# Patient Record
Sex: MALE | Race: WHITE | Employment: UNEMPLOYED | RURAL
[De-identification: names, ages, dates, MRNs, and addresses within clinical notes are randomized per-mention and may not be internally consistent; named-entity substitution may affect disease eponyms.]

---

## 2017-05-23 ENCOUNTER — HOSPITAL ENCOUNTER (OUTPATIENT)
Dept: OTHER | Age: 27
Discharge: HOME | End: 2017-05-23
Attending: NURSE PRACTITIONER | Admitting: NURSE PRACTITIONER

## 2017-05-23 LAB
A/G RATIO: 2 (ref 0.8–2)
ALBUMIN SERPL-MCNC: 4.7 G/DL (ref 3.4–4.8)
ALP BLD-CCNC: 82 U/L (ref 25–100)
ALT SERPL-CCNC: 14 U/L (ref 4–36)
ANION GAP SERPL CALCULATED.3IONS-SCNC: 14 MMOL/L (ref 3–16)
AST SERPL-CCNC: 17 U/L (ref 8–33)
BASOPHILS ABSOLUTE: 0 K/UL (ref 0–0.1)
BASOPHILS RELATIVE PERCENT: 0.2 %
BILIRUB SERPL-MCNC: 0.5 MG/DL (ref 0.3–1.2)
BUN BLDV-MCNC: 12 MG/DL (ref 6–20)
CALCIUM SERPL-MCNC: 9.5 MG/DL (ref 8.5–10.5)
CHLORIDE BLD-SCNC: 101 MMOL/L (ref 98–107)
CO2: 24 MMOL/L (ref 20–30)
CREAT SERPL-MCNC: 0.9 MG/DL (ref 0.4–1.2)
EOSINOPHILS ABSOLUTE: 0 K/UL (ref 0–0.4)
EOSINOPHILS RELATIVE PERCENT: 0.3 %
GFR AFRICAN AMERICAN: >59
GFR NON-AFRICAN AMERICAN: >59
GLOBULIN: 2.3 G/DL
GLUCOSE BLD-MCNC: 97 MG/DL (ref 74–106)
HCT VFR BLD CALC: 48.1 % (ref 40–54)
HEMOGLOBIN: 16.9 G/DL (ref 13–18)
LYMPHOCYTES ABSOLUTE: 2.5 K/UL (ref 1.5–4)
LYMPHOCYTES RELATIVE PERCENT: 23.4 % (ref 20–40)
MCH RBC QN AUTO: 32.3 PG (ref 27–32)
MCHC RBC AUTO-ENTMCNC: 35.1 G/DL (ref 31–35)
MCV RBC AUTO: 91.8 FL (ref 80–100)
MONOCYTES ABSOLUTE: 0.7 K/UL (ref 0.2–0.8)
MONOCYTES RELATIVE PERCENT: 6.2 % (ref 3–10)
NEUTROPHILS ABSOLUTE: 7.5 K/UL (ref 2–7.5)
NEUTROPHILS RELATIVE PERCENT: 69.9 %
PDW BLD-RTO: 13.1 % (ref 11–16)
PLATELET # BLD: 209 K/UL (ref 150–400)
PMV BLD AUTO: 10.4 FL (ref 6–10)
POTASSIUM SERPL-SCNC: 4.2 MMOL/L (ref 3.4–5.1)
RBC # BLD: 5.24 M/UL (ref 4.5–6)
SODIUM BLD-SCNC: 139 MMOL/L (ref 136–145)
TOTAL PROTEIN: 7 G/DL (ref 6.4–8.3)
WBC # BLD: 10.8 K/UL (ref 4–11)

## 2017-05-24 LAB
HAV IGM SER IA-ACNC: NORMAL
HEPATITIS B CORE IGM ANTIBODY: NORMAL
HEPATITIS B SURFACE ANTIGEN INTERPRETATION: NORMAL
HEPATITIS C ANTIBODY INTERPRETATION: NORMAL

## 2019-06-06 ENCOUNTER — HOSPITAL ENCOUNTER (EMERGENCY)
Facility: HOSPITAL | Age: 29
Discharge: HOME | End: 2019-06-06
Attending: HOSPITALIST
Payer: MEDICAID

## 2019-06-06 ENCOUNTER — APPOINTMENT (OUTPATIENT)
Dept: GENERAL RADIOLOGY | Facility: HOSPITAL | Age: 29
End: 2019-06-06
Payer: MEDICAID

## 2019-06-06 VITALS
DIASTOLIC BLOOD PRESSURE: 79 MMHG | RESPIRATION RATE: 14 BRPM | HEART RATE: 89 BPM | BODY MASS INDEX: 20.41 KG/M2 | OXYGEN SATURATION: 98 % | SYSTOLIC BLOOD PRESSURE: 131 MMHG | TEMPERATURE: 98.4 F | HEIGHT: 73 IN | WEIGHT: 154 LBS

## 2019-06-06 DIAGNOSIS — S62.300A UNSPECIFIED FRACTURE OF SECOND METACARPAL BONE, RIGHT HAND, INITIAL ENCOUNTER FOR CLOSED FRACTURE: Primary | ICD-10-CM

## 2019-06-06 PROCEDURE — 6370000000 HC RX 637 (ALT 250 FOR IP): Performed by: HOSPITALIST

## 2019-06-06 PROCEDURE — 99283 EMERGENCY DEPT VISIT LOW MDM: CPT

## 2019-06-06 PROCEDURE — 73130 X-RAY EXAM OF HAND: CPT

## 2019-06-06 PROCEDURE — 6370000000 HC RX 637 (ALT 250 FOR IP)

## 2019-06-06 RX ORDER — OXYCODONE HYDROCHLORIDE AND ACETAMINOPHEN 5; 325 MG/1; MG/1
2 TABLET ORAL ONCE
Status: COMPLETED | OUTPATIENT
Start: 2019-06-06 | End: 2019-06-06

## 2019-06-06 RX ORDER — OXYCODONE AND ACETAMINOPHEN 10; 325 MG/1; MG/1
1 TABLET ORAL EVERY 4 HOURS PRN
Qty: 18 TABLET | Refills: 0 | Status: SHIPPED | OUTPATIENT
Start: 2019-06-06 | End: 2019-06-09

## 2019-06-06 RX ORDER — ONDANSETRON 4 MG/1
4 TABLET, ORALLY DISINTEGRATING ORAL EVERY 4 HOURS PRN
Qty: 15 TABLET | Refills: 0 | Status: SHIPPED | OUTPATIENT
Start: 2019-06-06 | End: 2021-08-26

## 2019-06-06 RX ORDER — ONDANSETRON 4 MG/1
TABLET, ORALLY DISINTEGRATING ORAL
Status: COMPLETED
Start: 2019-06-06 | End: 2019-06-06

## 2019-06-06 RX ORDER — ONDANSETRON 4 MG/1
4 TABLET, ORALLY DISINTEGRATING ORAL ONCE
Status: COMPLETED | OUTPATIENT
Start: 2019-06-06 | End: 2019-06-06

## 2019-06-06 RX ADMIN — ONDANSETRON 4 MG: 4 TABLET, ORALLY DISINTEGRATING ORAL at 17:52

## 2019-06-06 RX ADMIN — OXYCODONE HYDROCHLORIDE AND ACETAMINOPHEN 2 TABLET: 5; 325 TABLET ORAL at 17:16

## 2019-06-06 ASSESSMENT — PAIN - FUNCTIONAL ASSESSMENT: PAIN_FUNCTIONAL_ASSESSMENT: 0-10

## 2019-06-06 ASSESSMENT — PAIN SCALES - GENERAL
PAINLEVEL_OUTOF10: 6
PAINLEVEL_OUTOF10: 9
PAINLEVEL_OUTOF10: 8

## 2019-06-06 ASSESSMENT — PAIN DESCRIPTION - PAIN TYPE
TYPE: ACUTE PAIN
TYPE: ACUTE PAIN

## 2019-06-06 ASSESSMENT — PAIN DESCRIPTION - ORIENTATION
ORIENTATION: RIGHT
ORIENTATION: RIGHT

## 2019-06-06 ASSESSMENT — PAIN DESCRIPTION - LOCATION
LOCATION: HAND
LOCATION: HAND

## 2019-06-06 ASSESSMENT — PAIN DESCRIPTION - DESCRIPTORS
DESCRIPTORS: ACHING
DESCRIPTORS: THROBBING

## 2019-06-06 ASSESSMENT — PAIN DESCRIPTION - FREQUENCY: FREQUENCY: CONTINUOUS

## 2019-06-06 NOTE — ED PROVIDER NOTES
[facility] ENCOUNTER      Pt Name: Catherine Bowen  MRN: [id number]  YOB: 1990  Date of evaluation: 6/6/2019  Provider: George Santacruz [address] [place] [address]       Chief Complaint   Patient presents with    Hand Injury     right         HISTORY OF PRESENT ILLNESS  (Location/Symptom, Timing/Onset, Context/Setting, Quality, Duration, Modifying Factors, Severity.)   Catherine Bowen is a 26 y.o. male who presents to the emergency department for right hand injury. Patient states he was helping someone cut some wood he reached down to get a block of it when another block, also saw and struck him on the top of the hand. Patient denies any entrapment of this and states the blockages hit the hand and bounced off. He does complain of swelling and pain to the base of the 1st finger. Patient has not really taken anything for pain. He states the injury happened about 6-8 hours ago. Denies any numbness tingling weakness of extremities out of ordinary. Patient states she just has difficulty moving the 1st finger and the thumb secondary to swelling in the hand. Patient has not used any ice over the injury prior to arrival. Denies any other injury or complaint. Nursing notes were reviewed. REVIEW OFSYSTEMS    (2-9 systems for level 4, 10 or more for level 5)   ROS:  General:  No fevers, no chills, no weakness  Cardiovascular:  No chest pain, no palpitations  Respiratory:  No shortness of breath, no cough, no wheezing  Gastrointestinal:  No pain, no nausea, no vomiting, no diarrhea  Musculoskeletal:  No muscle pain, +right hand pain  Skin:  No rash, no easy bruising  Neurologic:  No speech problems, no headache, no extremity weakness  Psychiatric:  No anxiety  Genitourinary:  No dysuria, no hematuria    Except as noted above the remainder of the review of systems was reviewed and negative. PAST MEDICAL HISTORY   History reviewed.  No pertinent past medical history.       SURGICAL HISTORY       Past Surgical History:   Procedure Laterality Date    DENTAL SURGERY      all teeth removed     MOUTH SURGERY           CURRENT MEDICATIONS       Previous Medications    No medications on file       ALLERGIES     Amoxicillin and Pcn [penicillins]    FAMILY HISTORY       Family History   Problem Relation Age of Onset    Diabetes Mother     Asthma Mother     Other Mother     Other Maternal Grandfather           SOCIAL HISTORY       Social History     Socioeconomic History    Marital status: Single     Spouse name: None    Number of children: None    Years of education: None    Highest education level: None   Occupational History    None   Social Needs    Financial resource strain: None    Food insecurity:     Worry: None     Inability: None    Transportation needs:     Medical: None     Non-medical: None   Tobacco Use    Smoking status: Current Every Day Smoker     Packs/day: 1.00     Years: 14.00     Pack years: 14.00     Types: Cigarettes    Smokeless tobacco: Never Used   Substance and Sexual Activity    Alcohol use: Yes     Comment: occasionaly    Drug use: No     Frequency: 2.0 times per week     Types: Marijuana     Comment: patient states he just quick    Sexual activity: None   Lifestyle    Physical activity:     Days per week: None     Minutes per session: None    Stress: None   Relationships    Social connections:     Talks on phone: None     Gets together: None     Attends Voodoo service: None     Active member of club or organization: None     Attends meetings of clubs or organizations: None     Relationship status: None    Intimate partner violence:     Fear of current or ex partner: None     Emotionally abused: None     Physically abused: None     Forced sexual activity: None   Other Topics Concern    None   Social History Narrative    None         PHYSICAL EXAM    (up to 7 for level 4, 8 or more for level 5)     ED Triage Vitals [06/06/19 1705]   BP Temp Temp Source Pulse Resp SpO2 Height Weight   124/84 98.4 °F (36.9 °C) Oral 100 18 99 % 6' 1\" (1.854 m) 154 lb (69.9 kg)       Physical Exam  General :Patient is awake, alert, oriented, in no acute distress, nontoxic appearing  HEENT: Pupils are equally round and reactive to light, EOMI, conjunctivae clear. Oral mucosa is moist, no exudate. Uvula is midline  Cardiac: Heart regular rate, rhythm, no murmurs, rubs, or gallops  Lungs: Lungs are clear to auscultation, there is no wheezing, rhonchi, or rales. There is no use of accessory muscles. Abdomen: Abdomen is soft, nontender, nondistended. There is no firm or pulsatile masses, no rebound rigidity or guarding. Musculoskeletal: 5 out of 5 strength in all 4 extremities. No focal muscle deficits are appreciated, obvious swelling noted at the base of the 2nd digit of the right hand. Range of motion is intact however he does have difficulty with bringing the thumb and pinky finger together secondary to swelling. There is mild bruising noted at the base of the 2nd digit also. Sensation is still intact. Cap refill less than 2 seconds  Neuro: Motor intact, sensory intact, level of consciousness is normal  Dermatology: Skin is warm and dry  Psych: Mentation is grossly normal, cognition is grossly normal. Affect is appropriate.       DIAGNOSTIC RESULTS     EKG: All EKG's are interpreted by the Emergency Department Physician who either signs or Co-signs this chart in the [address] a cardiologist.        RADIOLOGY:   Non-plain film images such as CT, Ultrasound and MRI are read by the radiologist. Plain radiographic images are visualized and preliminarily interpreted by the emergency physician with the below findings:      ? Radiologist's Report Reviewed:  XR HAND RIGHT (MIN 3 VIEWS)   Final Result      Acute fracture involving second metatarsal head and metaphysis            ED BEDSIDE ULTRASOUND:   Performed by ED Physician - none    LABS:    I have reviewed and interpreted all of the currently available lab results from this visit (ifapplicable):  No results found for this visit on 06/06/19. All other labs were within normal range or not returned as of this dictation. EMERGENCY DEPARTMENT COURSE and DIFFERENTIAL DIAGNOSIS/MDM:   Vitals:    Vitals:    06/06/19 1705   BP: 124/84   Pulse: 100   Resp: 18   Temp: 98.4 °F (36.9 °C)   TempSrc: Oral   SpO2: 99%   Weight: 154 lb (69.9 kg)   Height: 6' 1\" (1.854 m)       MEDICATIONS ADMINISTERED IN ED:  Medications   oxyCODONE-acetaminophen (PERCOCET) 5-325 MG per tablet 2 tablet (2 tablets Oral Given 6/6/19 1716)   ondansetron (ZOFRAN-ODT) disintegrating tablet 4 mg (4 mg Oral Given 6/6/19 1752)       After initial evaluation and examination I did have conversation with the patient about the upcoming plan, treatment and possible disposition which they were agreeable to the time of this dictation. Patient otherwise we will call a radiograph of the right hand. We will also supplying him with some ice here for the swelling. Patient be given 5/325 2 tablets Percocet for pain. Patient advised couldn't arrive anything stronger than Motrin if the radiograph is negative for fracture he does state his understanding. Patient's final disposition be determined once his radiological studies been performed and reviewed. Radiograph the right hand read by radiology as acute fracture involving 2nd minute carpal head and metastasis    Patient's radiological studies were discussed with him and he does state his understanding. She was offered imaging here with a Ortho-Glass splint however we do not have the Ortho-Glass wide enough to cover all the way up to the index finger on the hand the patient has been placed in a wrist splint which will not provide the most adequate at best immobilization for that hand but is better than what we could acquire with the use of the Ortho-Glass.  Patient was placed in a Velcro wrist splint which is the phrases that may be inappropriate. If there are any questions or concerns please feel free to contact the dictating providerfor clarification.     Donte Tam DO  Attending Emergency Physician              Donte Tam DO  06/06/19 [id number]

## 2019-06-07 ENCOUNTER — OFFICE VISIT (OUTPATIENT)
Dept: ORTHOPEDIC SURGERY | Facility: CLINIC | Age: 29
End: 2019-06-07

## 2019-06-07 VITALS — HEIGHT: 71 IN | WEIGHT: 173.6 LBS | BODY MASS INDEX: 24.3 KG/M2 | RESPIRATION RATE: 18 BRPM

## 2019-06-07 DIAGNOSIS — M79.641 RIGHT HAND PAIN: Primary | ICD-10-CM

## 2019-06-07 DIAGNOSIS — S62.360A CLOSED NONDISPLACED FRACTURE OF NECK OF SECOND METACARPAL BONE OF RIGHT HAND, INITIAL ENCOUNTER: ICD-10-CM

## 2019-06-07 PROCEDURE — 29125 APPL SHORT ARM SPLINT STATIC: CPT | Performed by: PHYSICIAN ASSISTANT

## 2019-06-07 PROCEDURE — 99203 OFFICE O/P NEW LOW 30 MIN: CPT | Performed by: PHYSICIAN ASSISTANT

## 2019-06-07 RX ORDER — INDOMETHACIN 25 MG/1
25 CAPSULE ORAL 3 TIMES DAILY PRN
Qty: 15 CAPSULE | Refills: 0 | Status: SHIPPED | OUTPATIENT
Start: 2019-06-07 | End: 2019-06-11

## 2019-06-07 RX ORDER — OXYCODONE AND ACETAMINOPHEN 10; 325 MG/1; MG/1
TABLET ORAL
Refills: 0 | COMMUNITY
Start: 2019-06-06 | End: 2022-10-04

## 2019-06-07 RX ORDER — ONDANSETRON 4 MG/1
TABLET, ORALLY DISINTEGRATING ORAL
Refills: 0 | COMMUNITY
Start: 2019-06-06

## 2019-06-07 RX ORDER — HYDROCODONE BITARTRATE AND ACETAMINOPHEN 5; 325 MG/1; MG/1
1 TABLET ORAL EVERY 12 HOURS PRN
Qty: 14 TABLET | Refills: 0 | OUTPATIENT
Start: 2019-06-07 | End: 2022-10-04

## 2019-06-07 NOTE — PROGRESS NOTES
Subjective   Patient ID: Jorge Lopez is a 29 y.o. right hand dominant male  Pain of the Right Hand (Patient is here today for a right hand fracture, he states he was helping packing logs for a pig roast and his hand got caught in between to two big tree blocks. He states this happened 2 days ago.)         History of Present Illness  Patient presents as a new patient with complaints of right hand injury.  He states his right hand got caught between 2 tree blocks 2 days ago injuring the right hand.  He was seen at [facility] had x-rays diagnosed with a second metacarpal fracture and placed in an Ace wrap.  Patient states he did crush his third finger years ago but has done well since that injury  Patient denies numbness or tingling                                                 History reviewed. No pertinent past medical history.     History reviewed. No pertinent surgical history.    History reviewed. No pertinent family history.    Social History     Socioeconomic History   • Marital status: Unknown     Spouse name: Not on file   • Number of children: Not on file   • Years of education: Not on file   • Highest education level: Not on file   Tobacco Use   • Smoking status: Current Every Day Smoker   • Smokeless tobacco: Never Used   Substance and Sexual Activity   • Alcohol use: Yes   • Drug use: Yes     Types: Marijuana   • Sexual activity: Defer       I have reviewed all of the above social hx, family hx, surgical hx, medications, allergies & ROS and confirm that it is accurate.      Current Outpatient Medications:   •  HYDROcodone-acetaminophen (NORCO) 5-325 MG per tablet, Take 1 tablet by mouth Every 12 (Twelve) Hours As Needed for Other (PRN PAIN)., Disp: 14 tablet, Rfl: 0  •  indomethacin (INDOCIN) 25 MG capsule, Take 1 capsule by mouth 3 (Three) Times a Day As Needed for Mild Pain ., Disp: 15 capsule, Rfl: 0  •  ondansetron ODT (ZOFRAN-ODT) 4 MG disintegrating tablet, dissolve 1 tablet ON TONGUE  "every 4 hours if needed for nausea and vomiting, Disp: , Rfl: 0  •  oxyCODONE-acetaminophen (PERCOCET)  MG per tablet, take 1 tablet by mouth every 4 hours if needed for pain, Disp: , Rfl: 0    Allergies   Allergen Reactions   • Amoxicillin Rash       Review of Systems   Constitutional: Negative for fever.   HENT: Negative for voice change.    Eyes: Negative for visual disturbance.   Respiratory: Negative for shortness of breath.    Cardiovascular: Negative for chest pain.   Gastrointestinal: Negative for abdominal pain.   Genitourinary: Negative for dysuria.   Musculoskeletal: Positive for arthralgias. Negative for gait problem and joint swelling.   Skin: Negative for rash.   Neurological: Negative for speech difficulty.   Hematological: Does not bruise/bleed easily.   Psychiatric/Behavioral: Negative for confusion.       Objective   Resp 18   Ht 180.3 cm (71\")   Wt 78.7 kg (173 lb 9.6 oz)   BMI 24.21 kg/m²    Physical Exam   Constitutional: He appears well-developed.   Pulmonary/Chest: Effort normal.   Musculoskeletal:        Right wrist: He exhibits swelling. He exhibits no bony tenderness, no deformity and no laceration.        Right hand: He exhibits tenderness (dorsum of hand 2nd MC) and swelling. He exhibits normal two-point discrimination and normal capillary refill. Normal sensation noted. Normal strength noted.   Neurological: He is alert.   Psychiatric: He has a normal mood and affect.   Vitals reviewed.    Ortho Exam      Neurologic Exam     Neg. Zack's test          Assessment/Plan   Independent Review of Radiographic Studies:    I did review x-ray imaging from [facility] of the right hand.  There is an acute second metacarpal head and neck fracture.    I did reorder new x-rays of the right hand in the office since he was only immobilized in an Ace wrap to make certain there has been no interval displacement or change-there is been no interval change when compared to prior " imaging      Procedures       Jorge was seen today for pain.    Diagnoses and all orders for this visit:    Right hand pain  -     XR Wrist 3+ View Right    Closed nondisplaced fracture of neck of second metacarpal bone of right hand, initial encounter    Other orders  -     indomethacin (INDOCIN) 25 MG capsule; Take 1 capsule by mouth 3 (Three) Times a Day As Needed for Mild Pain .       Discussion of orthopedic goals  Risk, benefits, and merits of treatment alternatives reviewed with the patient and questions answered  Reduced physical activity as appropriate  Weight bearing parameters reviewed  Avoid offending activity    Recommendations/Plan:  Patient is encouraged to call or return for any issues or concerns.  Due to the swelling, I would like to hold off on fiberglass cast.  However, we did place the patient in a short arm Ortho-Glass splint immobilizing the second and third digits extending the orthoglass to the wrist.   Padding was placed interdigitally to the second third digit to prevent skin irritation    FU in 5 days for cast placement and re-examination    Patient agreeable to call or return sooner for any concerns.

## 2019-06-10 DIAGNOSIS — S62.360A CLOSED NONDISPLACED FRACTURE OF NECK OF SECOND METACARPAL BONE OF RIGHT HAND, INITIAL ENCOUNTER: Primary | ICD-10-CM

## 2019-06-11 ENCOUNTER — OFFICE VISIT (OUTPATIENT)
Dept: ORTHOPEDIC SURGERY | Facility: CLINIC | Age: 29
End: 2019-06-11

## 2019-06-11 VITALS — HEIGHT: 71 IN | BODY MASS INDEX: 24.22 KG/M2 | WEIGHT: 173 LBS | RESPIRATION RATE: 18 BRPM

## 2019-06-11 DIAGNOSIS — M79.641 RIGHT HAND PAIN: ICD-10-CM

## 2019-06-11 DIAGNOSIS — S62.360A CLOSED NONDISPLACED FRACTURE OF NECK OF SECOND METACARPAL BONE OF RIGHT HAND, INITIAL ENCOUNTER: Primary | ICD-10-CM

## 2019-06-11 PROCEDURE — 99212 OFFICE O/P EST SF 10 MIN: CPT | Performed by: PHYSICIAN ASSISTANT

## 2019-06-11 PROCEDURE — 29075 APPL CST ELBW FNGR SHORT ARM: CPT | Performed by: PHYSICIAN ASSISTANT

## 2019-06-11 RX ORDER — HYDROCODONE BITARTRATE AND ACETAMINOPHEN 5; 325 MG/1; MG/1
1 TABLET ORAL EVERY 12 HOURS PRN
Qty: 8 TABLET | Refills: 0 | OUTPATIENT
Start: 2019-06-11 | End: 2022-10-04

## 2019-06-11 RX ORDER — MELOXICAM 7.5 MG/1
7.5 TABLET ORAL DAILY
Qty: 14 TABLET | Refills: 0
Start: 2019-06-11

## 2019-06-11 NOTE — PROGRESS NOTES
Subjective   Patient ID: Jorge Lopez is a 29 y.o. right hand dominant male   Follow-up of the Right Hand (2nd metatarsal fracture, also reports a fall 6/10/19 down a wet hill)         CHIEF COMPLAINT:        History of Present Illness    Pain controlled: [x] no patient request a refill on the narcotic pain medication     Medication refill requested: [] no   [x] yes     Patient states the swelling has eased some. Denies numbness or tingling to Right hand.  He states yesterday he did trip and fall but was wearing the splint and tried to protect the right hand as much as possible   Other:        History reviewed. No pertinent past medical history.     No past surgical history on file.     History reviewed. No pertinent family history.    Social History     Socioeconomic History   • Marital status: Unknown     Spouse name: Not on file   • Number of children: Not on file   • Years of education: Not on file   • Highest education level: Not on file   Tobacco Use   • Smoking status: Current Every Day Smoker   • Smokeless tobacco: Never Used   Substance and Sexual Activity   • Alcohol use: Yes   • Drug use: Yes     Types: Marijuana   • Sexual activity: Defer       Allergies   Allergen Reactions   • Indometacin Sodium [Indomethacin] Hallucinations   • Penicillins Swelling     Rash swelling     • Amoxicillin Rash       Review of Systems   Constitutional: Negative for diaphoresis, fever and unexpected weight change.   HENT: Negative for dental problem and sore throat.    Eyes: Negative for visual disturbance.   Respiratory: Negative for shortness of breath.    Cardiovascular: Negative for chest pain.   Gastrointestinal: Negative for abdominal pain, constipation, diarrhea, nausea and vomiting.   Genitourinary: Negative for difficulty urinating and frequency.   Musculoskeletal: Positive for arthralgias (right hand).   Neurological: Negative for headaches.   Hematological: Does not bruise/bleed easily.       [x]  The past medical  "history, past surgical history, family history, social history, allergies and review of systems have been reviewed by myself.    Objective   Resp 18   Ht 180.3 cm (71\")   Wt 78.5 kg (173 lb)   BMI 24.13 kg/m²      Signs of infection: [x] no                  [] yes   Swelling: [x] no                  [] yes   Skin wound: [] healing well   [] healed well   [x] skin intact   Motor exam intact: [] no                  [x] yes   Neurovascular exam intact: [] no                  [x] yes   Signs of compartment syndrome: [x] no                  [] yes   Signs of DVT: [x] no                  [] yes   Other:      Physical Exam   Constitutional: He appears well-developed.   Pulmonary/Chest: Effort normal.   Musculoskeletal:        Right wrist: He exhibits normal range of motion, no bony tenderness, no swelling, no crepitus, no deformity and no laceration.        Right hand: He exhibits tenderness (dorsum of right hand) and swelling (minimal and improved when compared to last office visit). He exhibits no deformity. Normal sensation noted. Normal strength noted. He exhibits no thumb/finger opposition.   Neurological: He is alert.   Psychiatric: He has a normal mood and affect.   Vitals reviewed.    Ortho Exam    There is no scissoring deformity with flexion of the right hand digits    Neurologic Exam      Assessment/Plan   Independent Review of Radiographic Studies:    X-ray of the right hand performed in the office for evaluation of second metacarpal head neck fracture healing.  Comparison films are available reviewed.  There has been no interval displacement.  There is satisfactory position alignment.  Dr. Aquino reviewed the imaging as well and concurs with the plan of care       Procedures    Jorge was seen today for follow-up.    Diagnoses and all orders for this visit:    Closed nondisplaced fracture of neck of second metacarpal bone of right hand, initial encounter    Right hand pain    Other orders  -     meloxicam " "(MOBIC) 7.5 MG tablet; Take 1 tablet by mouth Daily.         Physical therapy: Not ordered   Ultrasound: [x]not ordered         []order given to patient   Labs: [x]not ordered         []order given to patient   Weight Bearing status: []Full []WBAT []PWB [x]NWB []Other      Reduced physical activity as appropriate and avoid offending activity  Weight bearing parameters reviewed  Discussion of orthopaedic goals and activities and patient and/or guardian expressed appreciation.  Risk, benefits, and merits of treatment alternatives reviewed with the patient and/or guardian and questions answered  Regular exercise as tolerated  Guided on proper techniques for mobility, strength, agility and/or conditioning exercises  Cast, splint and/or brace care and usage instructions given         Recommendations/Plan:    Patient states the indomethacin did not cause any symptoms until after he had had several doses he noticed \"objects running across the floor\" his mother advised he stop taking the medicine.  He does request a refill on the narcotic pain prescription.     FU in 3 weeks XOA  Patient was placed in a fiberglass cast with padding interdigitally to the second /third digits.  The cast secured the 2nd and 3rd digits    norco rx was also sent to Dr. Aquino for approval. See med tab    Patient is encouraged and agreeable to call or return sooner for any issues or concerns.         "

## 2019-06-25 ENCOUNTER — HOSPITAL ENCOUNTER (EMERGENCY)
Facility: HOSPITAL | Age: 29
Discharge: HOME | End: 2019-06-25
Attending: EMERGENCY MEDICINE
Payer: MEDICAID

## 2019-06-25 VITALS
BODY MASS INDEX: 23.7 KG/M2 | OXYGEN SATURATION: 94 % | DIASTOLIC BLOOD PRESSURE: 89 MMHG | HEIGHT: 72 IN | TEMPERATURE: 98.5 F | SYSTOLIC BLOOD PRESSURE: 124 MMHG | RESPIRATION RATE: 16 BRPM | WEIGHT: 175 LBS | HEART RATE: 111 BPM

## 2019-06-25 DIAGNOSIS — Z48.00 ENCOUNTER FOR CAST CARE: Primary | ICD-10-CM

## 2019-06-25 PROCEDURE — 99282 EMERGENCY DEPT VISIT SF MDM: CPT

## 2019-06-25 ASSESSMENT — PAIN SCALES - GENERAL
PAINLEVEL_OUTOF10: 4
PAINLEVEL_OUTOF10: 7

## 2019-06-25 ASSESSMENT — PAIN DESCRIPTION - LOCATION
LOCATION: ARM
LOCATION: ARM

## 2019-06-25 ASSESSMENT — PAIN DESCRIPTION - PAIN TYPE
TYPE: ACUTE PAIN
TYPE: ACUTE PAIN

## 2019-06-25 ASSESSMENT — PAIN DESCRIPTION - ORIENTATION
ORIENTATION: RIGHT
ORIENTATION: RIGHT

## 2019-06-25 ASSESSMENT — PAIN DESCRIPTION - DESCRIPTORS
DESCRIPTORS: SHARP;BURNING
DESCRIPTORS: ACHING

## 2019-06-25 ASSESSMENT — PAIN DESCRIPTION - FREQUENCY: FREQUENCY: INTERMITTENT

## 2019-06-25 ASSESSMENT — PAIN DESCRIPTION - PROGRESSION: CLINICAL_PROGRESSION: GRADUALLY WORSENING

## 2019-06-25 ASSESSMENT — PAIN - FUNCTIONAL ASSESSMENT: PAIN_FUNCTIONAL_ASSESSMENT: 0-10

## 2019-06-25 ASSESSMENT — PAIN DESCRIPTION - ONSET: ONSET: ON-GOING

## 2019-06-26 NOTE — ED TRIAGE NOTES
Pt. States had a right arm fracture linn. 2 weeks ago when a block of wood fell on arm states wore cast linn. 1 week when cast became wet and pt.  Removed him self states increased pain from fingers to elbow

## 2019-06-26 NOTE — ED PROVIDER NOTES
[address] [place] [address] Court  eMERGENCY dEPARTMENT eNCOUnter      Pt Name: Jose Simpson  MRN: [id number]  YOB: 1990  Date ofevaluation: 6/59/1407  Provider: Fidel Man MD    [address] [place] [address]       Chief Complaint   Patient presents with    Arm Pain     right arm pain pt. states got his cast wet- from an FX and removed Sunday had on linn. 1 week pain from fingers to elbow         HISTORY OF PRESENT ILLNESS  (Location/Symptom, Timing/Onset, Context/Setting, Quality, Duration, Modifying Factors, Severity.)   Jose Simposn is a 26 y.o. male who presents to the emergency department for right arm pain. He has a known right 2nd metacarpal fracture. He had a cast on his arm that was placed by ortho but he admits that he got it wet and cut it off. He says the cast was \"getting on my nerves\". Nursing notes were reviewed. REVIEW OF SYSTEMS    (2-9systems for level 4, 10 or more for level 5)   ROS:  General:  No fevers, no chills, no weakness  HEENT: No sore throat, runny nose or ear pain  Cardiovascular:  No chest pain, no palpitations  Respiratory:  No shortness of breath, no cough, no wheezing  Gastrointestinal:  No pain, no nausea, no vomiting, no diarrhea  Musculoskeletal:  + right hand/forearm pain from previous fracture. No muscle pain, no joint pain  Skin:  No rash, no easy bruising  Genitourinary:  No dysuria, no hematuria    Except as noted above theremainder of the review of systems was reviewed and negative.        PASTMEDICAL HISTORY     Past Medical History:   Diagnosis Date    Arm fracture, right          SURGICAL HISTORY       Past Surgical History:   Procedure Laterality Date    DENTAL SURGERY      all teeth removed     MOUTH SURGERY           CURRENT MEDICATIONS       Previous Medications    ONDANSETRON (ZOFRAN ODT) 4 MG DISINTEGRATING TABLET    Take 1 tablet by mouth every 4 hours as needed for Nausea or Vomiting       ALLERGIES     Amoxicillin and Pcn [penicillins]    FAMILY HISTORY       Family History   Problem Relation Age of Onset    Diabetes Mother     Asthma Mother     Other Mother     Other Maternal Grandfather           SOCIAL HISTORY       Social History     Socioeconomic History    Marital status: Single     Spouse name: None    Number of children: None    Years of education: None    Highest education level: None   Occupational History    None   Social Needs    Financial resource strain: None    Food insecurity:     Worry: None     Inability: None    Transportation needs:     Medical: None     Non-medical: None   Tobacco Use    Smoking status: Current Every Day Smoker     Packs/day: 1.00     Years: 14.00     Pack years: 14.00     Types: Cigarettes    Smokeless tobacco: Never Used   Substance and Sexual Activity    Alcohol use: Yes     Comment: occasionaly    Drug use: Yes     Types: Marijuana     Comment: states used last night    Sexual activity: None   Lifestyle    Physical activity:     Days per week: None     Minutes per session: None    Stress: None   Relationships    Social connections:     Talks on phone: None     Gets together: None     Attends Voodoo service: None     Active member of club or organization: None     Attends meetings of clubs or organizations: None     Relationship status: None    Intimate partner violence:     Fear of current or ex partner: None     Emotionally abused: None     Physically abused: None     Forced sexual activity: None   Other Topics Concern    None   Social History Narrative    None         PHYSICAL EXAM    (up to 7 forlevel 4, 8 or more for level 5)     ED Triage Vitals   BP Temp Temp Source Pulse Resp SpO2 Height Weight   06/25/19 2124 06/25/19 2133 06/25/19 2124 06/25/19 2124 06/25/19 2124 06/25/19 2124 06/25/19 2124 06/25/19 2124   (!) 142/90 98.5 °F (36.9 °C) Oral 106 20 99 % 6' (1.829 m) 175 lb (79.4 kg)       Physical Exam  General :Patient is awake, alert, oriented, in no acute distress, nontoxic appearing  HEENT: Pupils are equally round and reactive to light, EOMI. Cardiac: Heart regular rate, rhythm, no murmurs, rubs, or gallops  Lungs: Lungs are clear to auscultation, there is no wheezing, rhonchi, or rales. Abdomen:Abdomen is soft, nontender, nondistended. Musculoskeletal: Ambulatory; tenderness to palpation to right hand/wrist; no swelling or ecchymosis. Back: No midline or bony tenderness. Dermatology: Skin is warm and dry  Psych: Mentation is grossly normal, cognition is grossly normal. Affect is appropriate. DIAGNOSTIC RESULTS       RADIOLOGY:   Non-plain film images such as CT, Ultrasoundand MRI are read by the radiologist. Plain radiographic images are visualized and preliminarily interpreted by the emergency physician with the below findings:      [] Radiologist's Report Reviewed:  No orders to display         ED BEDSIDE ULTRASOUND:   Performed by ED Physician - none    LABS:  Labs Reviewed - No data to display    I have reviewed and interpreted all of the currently available lab resultsfrom this visit (if applicable):  No results found for this visit on 06/25/19. All other labs were within normal range or not returned as of this dictation. EMERGENCY DEPARTMENT COURSE and DIFFERENTIAL DIAGNOSIS/MDM:   Vitals:    Vitals:    06/25/19 2124 06/25/19 2133   BP: (!) 142/90    Pulse: 106    Resp: 20    Temp:  98.5 °F (36.9 °C)   TempSrc: Oral Oral   SpO2: 99%    Weight: 175 lb (79.4 kg)    Height: 6' (1.829 m)        Posterior mold splint was placed by MD.  I told the patient he needs to keep the cast in place and followup with Ortho ASAP. The patient will follow-up with their PCP in 1-2 days for reevaluation. If the patient or family members have any further concerns or any worsening symptoms they will return to the ED for reevaluation.          CONSULTS:  None    PROCEDURES:  Procedures    CRITICAL CARE TIME    Total Critical Care time was 0 minutes, excluding separately reportable procedures. There was a high probability of clinically significant/life threatening deterioration in the patient's condition which required my urgent intervention. FINAL IMPRESSION      1. Encounter for cast care          DISPOSITION/PLAN   DISPOSITION Decision To Discharge 06/25/2019 09:51:26 PM      PATIENT REFERRED TO:  Earmon Boeck, MD  [address] [place] [address]  [phone]    Schedule an appointment as soon as possible for a visit in 2 days        DISCHARGE MEDICATIONS:  New Prescriptions    No medications on file       Comment: Please note this report has been produced using speech recognition software and may contain errors related tothat system including errors in grammar, punctuation, and spelling, as well as words and phrases that may be inappropriate. If there are any questions or concerns please feel free to contact the dictating provider forclarification.     Fidel Man MD  Attending Emergency Physician                  Fidel Man MD  60/01/94 1475       Fidel Man MD  06/25/19 6548

## 2019-06-26 NOTE — ED NOTES
Right arm pink, positive pulse, pt. Able to move arm and bend arm with out diff. States most of pain starts between index and 3rd fingers with radiation to elbow.  States to follow up with Surgeon on July 6th     [facility]  06/25/19 6602

## 2019-07-01 DIAGNOSIS — S62.360A CLOSED NONDISPLACED FRACTURE OF NECK OF SECOND METACARPAL BONE OF RIGHT HAND, INITIAL ENCOUNTER: Primary | ICD-10-CM

## 2021-08-26 ENCOUNTER — HOSPITAL ENCOUNTER (EMERGENCY)
Facility: HOSPITAL | Age: 31
Discharge: HOME | End: 2021-08-26
Attending: EMERGENCY MEDICINE
Payer: MEDICAID

## 2021-08-26 VITALS
SYSTOLIC BLOOD PRESSURE: 115 MMHG | OXYGEN SATURATION: 98 % | RESPIRATION RATE: 20 BRPM | TEMPERATURE: 97.9 F | BODY MASS INDEX: 23.19 KG/M2 | DIASTOLIC BLOOD PRESSURE: 87 MMHG | HEIGHT: 73 IN | WEIGHT: 175 LBS | HEART RATE: 52 BPM

## 2021-08-26 DIAGNOSIS — H57.9 SENSATION OF FOREIGN BODY IN EYE: ICD-10-CM

## 2021-08-26 DIAGNOSIS — S05.02XA ABRASION OF LEFT CORNEA, INITIAL ENCOUNTER: Primary | ICD-10-CM

## 2021-08-26 PROCEDURE — 99282 EMERGENCY DEPT VISIT SF MDM: CPT

## 2021-08-26 RX ORDER — CIPROFLOXACIN HYDROCHLORIDE 3.5 MG/ML
1 SOLUTION/ DROPS TOPICAL
Qty: 120 DROP | Refills: 0 | Status: SHIPPED | OUTPATIENT
Start: 2021-08-26 | End: 2021-09-05

## 2021-08-26 ASSESSMENT — PAIN SCALES - GENERAL: PAINLEVEL_OUTOF10: 10

## 2021-08-26 ASSESSMENT — PAIN DESCRIPTION - FREQUENCY: FREQUENCY: CONTINUOUS

## 2021-08-26 ASSESSMENT — VISUAL ACUITY
OS: 20/13
OU: 20/13
OD: 20/15

## 2021-08-26 ASSESSMENT — PAIN DESCRIPTION - ORIENTATION: ORIENTATION: LEFT

## 2021-08-26 ASSESSMENT — PAIN DESCRIPTION - LOCATION: LOCATION: EYE

## 2021-08-26 ASSESSMENT — PAIN DESCRIPTION - PROGRESSION: CLINICAL_PROGRESSION: GRADUALLY WORSENING

## 2021-08-26 ASSESSMENT — PAIN DESCRIPTION - PAIN TYPE: TYPE: ACUTE PAIN

## 2021-08-26 NOTE — ED PROVIDER NOTES
[address] [place] [address] Court  eMERGENCY dEPARTMENT eNCOUnter      Pt Name: Lucio Medina  MRN: [id number]  YOB: 1990  Date ofevaluation: 8/26/2021  Provider: [facility], [address] [place] [address]       Chief Complaint   Patient presents with    Eye Pain         HISTORY OF PRESENT ILLNESS  (Location/Symptom, Timing/Onset, Context/Setting, Quality, Duration, Modifying Factors, Severity.)   Lucio Medina is a 27 y.o. 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 He stated that he washed the eyes several times and was able to wash off some of the pains but still feels foreign body sensation in that eye. He admits to intense lacrimation and continual pain. He denies any visual changes. Nursing notes were reviewed. REVIEW OF SYSTEMS    (2-9systems for level 4, 10 or more for level 5)   ROS:  General:  No fevers, no chills, no weakness  HEENT: No sore throat, runny nose or ear pain. Left eye foreign body sensation. Cardiovascular:  No chest pain, no palpitations  Respiratory:  No shortness of breath, no cough, no wheezing  Gastrointestinal:  No pain, no nausea, no vomiting, no diarrhea  Musculoskeletal:  No muscle pain, no joint pain  Skin:  No rash, no easy bruising  Genitourinary:  No dysuria, no hematuria    Except as noted above theremainder of the review of systems was reviewed and negative.        PASTMEDICAL HISTORY     Past Medical History:   Diagnosis Date    Arm fracture, right          SURGICAL HISTORY       Past Surgical History:   Procedure Laterality Date    DENTAL SURGERY      all teeth removed     MOUTH SURGERY           CURRENT MEDICATIONS       Previous Medications    BUPRENORPHINE HCL-NALOXONE HCL (SUBOXONE SL)    Place 8 mg under the tongue 3 times daily       ALLERGIES     Amoxicillin and Pcn [penicillins]    FAMILY HISTORY Exam  General :Patient is awake, alert, oriented, in no acute distress, nontoxic appearing  HEENT: Pupils are equally round and reactive to light, EOMI. multiple fluorescein uptake in many areas of his cornea. Cardiac: Heart regular rate, rhythm, no murmurs, rubs, or gallops  Lungs: Lungs are clear to auscultation, there is no wheezing, rhonchi, or rales. Abdomen:Abdomen is soft, nontender, nondistended. Musculoskeletal: Ambulatory  Back: No midline or bony tenderness. Dermatology: Skin is warm and dry  Psych: Mentation is grossly normal, cognition is grossly normal. Affect is appropriate. DIAGNOSTIC RESULTS       RADIOLOGY:   Non-plain film images such as CT, Ultrasoundand MRI are read by the radiologist. Plain radiographic images are visualized and preliminarily interpreted by the emergency physician with the below findings:      [] Radiologist's Report Reviewed:  No orders to display         ED BEDSIDE ULTRASOUND:   Performed by ED Physician - none    LABS:  Labs Reviewed - No data to display    I have reviewed and interpreted all of the currently available lab resultsfrom this visit (if applicable):  No results found for this visit on 08/26/21. All other labs were within normal range or not returned as of this dictation. EMERGENCY DEPARTMENT COURSE and DIFFERENTIAL DIAGNOSIS/MDM:   Vitals:    Vitals:    08/26/21 1601   BP: 112/78   Pulse: 53   Resp: 20   Temp: 98.1 °F (36.7 °C)   TempSrc: Oral   SpO2: 99%   Weight: 175 lb (79.4 kg)   Height: 6' 1\" (1.854 m)           The patient will follow-up with their PCP in 1-2 days for reevaluation. If the patient or family members have any further concerns or any worsening symptoms they will return to the ED for reevaluation. CONSULTS:  None    PROCEDURES:  Procedures    CRITICAL CARE TIME    Total Critical Care time was 0 minutes, excluding separately reportable procedures.    There was a high probability of clinically significant/life threatening deterioration in the patient's condition which required my urgent intervention. FINAL IMPRESSION      1. Abrasion of left cornea, initial encounter    2. Sensation of foreign body in eye          DISPOSITION/PLAN   DISPOSITION Decision To Discharge 08/26/2021 05:35:32 PM      PATIENT REFERRED TO:  RIVAS Mercado CNP  [address]  [phone]    In 1 day  If symptoms worsen      DISCHARGE MEDICATIONS:  New Prescriptions    CIPROFLOXACIN (CILOXAN) 0.3 % OPHTHALMIC SOLUTION    Place 1 drop into the left eye every 2 hours for 10 days       Comment: Please note this report has been produced using speech recognition software and may contain errors related tothat system including errors in grammar, punctuation, and spelling, as well as words and phrases that may be inappropriate. If there are any questions or concerns please feel free to contact the dictating provider forclarification.     Ondina Freeman DO  Attending Emergency Physician                  Brian Mcadams DO  08/26/21 7141

## 2021-08-26 NOTE — ED TRIAGE NOTES
Pt states at 9am pt got lead or red paint or rusted metal.Pt states pain 10/10 in left eye. Eye chart completed on pt. Pt has 20/15 vision in right eye, 20/13 vision is left eye, and 20/13 vision with both eyes. Eye notes to be red and irritated.

## 2021-08-26 NOTE — ED NOTES
Pt discharged at this time. Pt paper work reviewed. Pt educated on eye ointment. Pt refused tetanus shot. Pt left ambulatory.       [address] [place] [address]  08/26/21 7075

## 2021-11-22 ENCOUNTER — APPOINTMENT (OUTPATIENT)
Dept: GENERAL RADIOLOGY | Facility: HOSPITAL | Age: 31
End: 2021-11-22
Payer: MEDICAID

## 2021-11-22 ENCOUNTER — HOSPITAL ENCOUNTER (EMERGENCY)
Facility: HOSPITAL | Age: 31
Discharge: HOME | End: 2021-11-22
Attending: EMERGENCY MEDICINE
Payer: MEDICAID

## 2021-11-22 VITALS
WEIGHT: 175 LBS | HEART RATE: 76 BPM | BODY MASS INDEX: 23.09 KG/M2 | OXYGEN SATURATION: 98 % | RESPIRATION RATE: 16 BRPM | SYSTOLIC BLOOD PRESSURE: 117 MMHG | DIASTOLIC BLOOD PRESSURE: 74 MMHG | TEMPERATURE: 98.8 F

## 2021-11-22 DIAGNOSIS — S22.32XA CLOSED FRACTURE OF ONE RIB OF LEFT SIDE, INITIAL ENCOUNTER: Primary | ICD-10-CM

## 2021-11-22 PROCEDURE — 71101 X-RAY EXAM UNILAT RIBS/CHEST: CPT

## 2021-11-22 PROCEDURE — 99282 EMERGENCY DEPT VISIT SF MDM: CPT

## 2021-11-22 RX ORDER — KETOROLAC TROMETHAMINE 30 MG/ML
15 INJECTION, SOLUTION INTRAMUSCULAR; INTRAVENOUS ONCE
Status: DISCONTINUED | OUTPATIENT
Start: 2021-11-22 | End: 2021-11-22 | Stop reason: HOSPADM

## 2021-11-22 RX ORDER — KETOROLAC TROMETHAMINE 10 MG/1
10 TABLET, FILM COATED ORAL EVERY 6 HOURS PRN
Qty: 20 TABLET | Refills: 0 | Status: SHIPPED | OUTPATIENT
Start: 2021-11-22 | End: 2022-03-21

## 2021-11-22 ASSESSMENT — PAIN DESCRIPTION - LOCATION
LOCATION: RIB CAGE
LOCATION: RIB CAGE

## 2021-11-22 ASSESSMENT — PAIN SCALES - GENERAL
PAINLEVEL_OUTOF10: 9

## 2021-11-22 ASSESSMENT — PAIN DESCRIPTION - ORIENTATION
ORIENTATION: LEFT
ORIENTATION: LEFT

## 2021-11-22 ASSESSMENT — PAIN DESCRIPTION - PAIN TYPE
TYPE: ACUTE PAIN
TYPE: ACUTE PAIN

## 2021-11-22 ASSESSMENT — PAIN DESCRIPTION - FREQUENCY: FREQUENCY: CONTINUOUS

## 2021-11-22 ASSESSMENT — PAIN - FUNCTIONAL ASSESSMENT: PAIN_FUNCTIONAL_ASSESSMENT: 0-10

## 2021-11-22 ASSESSMENT — PAIN DESCRIPTION - DESCRIPTORS
DESCRIPTORS: ACHING;SHARP
DESCRIPTORS: ACHING

## 2021-11-22 NOTE — ED PROVIDER NOTES
[facility] ENCOUNTER      Pt Name: Emily Barboza  MRN: [id number]  YOB: 1990  Date of evaluation: 11/22/2021  Provider: Elina García MD    CHIEF COMPLAINT       Chief Complaint   Patient presents with    Rib Pain     left         HISTORY OF PRESENT ILLNESS  (Location/Symptom, Timing/Onset, Context/Setting, Quality, Duration, Modifying Factors, Severity.)   Emily Barboza is a 27 y.o. male who presents to the emergency department planing of possible broken ribs he states that 3 weeks ago he fell and landed on something in his left anterior lateral chest wall then about 3 days ago he was lifting a heavy beam and felt a pop in that area now has had increased pain and shortness of breath states is a sharp pain worse when he takes a deep breath but better at rest of note the patient is on Suboxone. Nursing notes were reviewed. REVIEW OFSYSTEMS    (2-9 systems for level 4, 10 or more for level 5)   ROS:  General:  No fevers, no chills, no weakness  Cardiovascular:  No chest pain, no palpitations  Respiratory:  + shortness of breath, no cough, no wheezing  Gastrointestinal:  No pain, no nausea, no vomiting, no diarrhea  Musculoskeletal:  No muscle pain, no joint pain  Skin:  No rash, no easy bruising  Neurologic:  No speech problems, no headache, no extremity weakness  Psychiatric:  No anxiety  Genitourinary:  No dysuria, no hematuria    Except as noted above the remainder of the review of systems was reviewed and negative.        PAST MEDICAL HISTORY     Past Medical History:   Diagnosis Date    Arm fracture, right          SURGICAL HISTORY       Past Surgical History:   Procedure Laterality Date    DENTAL SURGERY      all teeth removed     MOUTH SURGERY           CURRENT MEDICATIONS       Previous Medications    BUPRENORPHINE HCL-NALOXONE HCL (SUBOXONE SL)    Place 8 mg under the tongue 3 times daily       ALLERGIES     Amoxicillin and Pcn [penicillins]    FAMILY HISTORY       Family History   Problem Relation Age of Onset    Diabetes Mother     Asthma Mother     Other Mother     Other Maternal Grandfather           SOCIAL HISTORY       Social History     Socioeconomic History    Marital status: Single     Spouse name: None    Number of children: None    Years of education: None    Highest education level: None   Occupational History    None   Tobacco Use    Smoking status: Current Every Day Smoker     Packs/day: 1.00     Years: 14.00     Pack years: 14.00     Types: Cigarettes    Smokeless tobacco: Never Used   Vaping Use    Vaping Use: Never used   Substance and Sexual Activity    Alcohol use: Yes     Comment: occasionaly    Drug use: Yes     Types: Marijuana Berneta Javon)    Sexual activity: None   Other Topics Concern    None   Social History Narrative    None     Social Determinants of Health     Financial Resource Strain:     Difficulty of Paying Living Expenses: Not on file   Food Insecurity:     Worried About Running Out of Food in the Last Year: Not on file    Brianna of Food in the Last Year: Not on file   Transportation Needs:     Lack of Transportation (Medical): Not on file    Lack of Transportation (Non-Medical):  Not on file   Physical Activity:     Days of Exercise per Week: Not on file    Minutes of Exercise per Session: Not on file   Stress:     Feeling of Stress : Not on file   Social Connections:     Frequency of Communication with Friends and Family: Not on file    Frequency of Social Gatherings with Friends and Family: Not on file    Attends Lutheran Services: Not on file    Active Member of Clubs or Organizations: Not on file    Attends Club or Organization Meetings: Not on file    Marital Status: Not on file   Intimate Partner Violence:     Fear of Current or Ex-Partner: Not on file    Emotionally Abused: Not on file    Physically Abused: Not on file    Sexually Abused: Not on file   Housing Stability:     Unable to Pay for Housing in the Last Year: Not on file    Number of Places Lived in the Last Year: Not on file    Unstable Housing in the Last Year: Not on file         PHYSICAL EXAM    (up to 7 for level 4, 8 or more for level 5)     ED Triage Vitals [11/22/21 1753]   BP Temp Temp Source Pulse Resp SpO2 Height Weight   108/74 98.8 °F (37.1 °C) Oral 86 16 98 % -- 175 lb (79.4 kg)       Physical Exam  General :Patient is awake, alert, oriented, in no acute distress, nontoxic appearing  HEENT: Pupils are equally round and reactive to light, EOMI, conjunctivae clear. Cardiac: Heart regular rate, rhythm, no murmurs, rubs, or gallops  Lungs: Lungs are clear to auscultation, there is no wheezing, rhonchi, or rales. There is no use of accessory muscles. Chest wall: There is  tenderness to palpation over the left anterior lateral chest wall   Abdomen: Abdomen is soft, nontender, nondistended. There is no firm or pulsatile masses, no rebound rigidity or guarding. Musculoskeletal: 5 out of 5 strength in all 4 extremities. No focal muscle deficits are appreciated  Neuro: Motor intact, sensory intact, level of consciousness is normal, Dermatology: Skin is warm and dry  Psych: Mentation is grossly normal, cognition is grossly normal. Affect is appropriate. DIAGNOSTIC RESULTS     EKG: All EKG's are interpreted by the Emergency Department Physician who either signs or Co-signs this chart in the [address] a cardiologist.        RADIOLOGY:   Non-plain film images such as CT, Ultrasound and MRI are read by the radiologist. Plain radiographic images are visualized and preliminarily interpreted by the emergency physician with the below findings:      ? Radiologist's Report Reviewed:  XR RIBS LEFT INCLUDE CHEST (MIN 3 VIEWS)   Final Result      Nondisplaced left ninth anterolateral rib fracture. No acute pulmonary disease.             ED BEDSIDE ULTRASOUND:   Performed by ED Physician - none    LABS:    I have reviewed and interpreted all of the currently available lab results from this visit (ifapplicable):  No results found for this visit on 11/22/21. All other labs were within normal range or not returned as of this dictation. EMERGENCY DEPARTMENT COURSE and DIFFERENTIAL DIAGNOSIS/MDM:   Vitals:    Vitals:    11/22/21 1753   BP: 108/74   Pulse: 86   Resp: 16   Temp: 98.8 °F (37.1 °C)   TempSrc: Oral   SpO2: 98%   Weight: 175 lb (79.4 kg)       MEDICATIONS ADMINISTERED IN ED:  Medications   ketorolac (TORADOL) injection 15 mg (has no administration in time range)       Patient is stable I have discussed with him I will give him a shot of Toradol now and call him in some for home he was also instructed to follow-up with his Suboxone clinic and or his primary care concerning any possible changes in his medication to take care of his pain. The patient will follow-up with their PCP in 1-2 days for reevaluation. If the patient or family members have anyfurther concerns or any worsening symptoms they will return to the ED for reevaluation. CONSULTS:  None    PROCEDURES:  Procedures    CRITICAL CARE TIME    Total Critical Care time was 0 minutes, excluding separately reportable procedures. There was a high probability of clinically significant/life threatening deterioration in the patient's condition which required my urgent intervention. FINAL IMPRESSION      1.  Closed fracture of one rib of left side, initial encounter New Problem         DISPOSITION/PLAN   DISPOSITION    Stable discharge to home    PATIENT REFERRED TO:  RIVAS Barrera - CNP  [address]  [phone]    In 3 days        DISCHARGE MEDICATIONS:  New Prescriptions    KETOROLAC (TORADOL) 10 MG TABLET    Take 1 tablet by mouth every 6 hours as needed for Pain       Comment: Please note this report has been produced using speech recognition software and may contain errorsrelated to that system including errors in grammar, punctuation, and spelling, as well as words and phrases that may be inappropriate. If there are any questions or concerns please feel free to contact the dictating providerfor clarification.     Tay Fatima MD  Attending Emergency Physician              Tay Fatima MD  11/22/21 8632

## 2021-11-22 NOTE — ED TRIAGE NOTES
Pt arrives to ED POV with complaints of L rib pain. Pt states approx 2 weeks ago he fell and injured his L rib cage but didn't come in because he takes Suboxone and knew he couldn't have pain meds. Pt states 3 days ago he fell while working on a ceiling and hit his L ribs again and felt a \"pop\".

## 2021-11-23 NOTE — ED NOTES
Reviewed discharge plan with Nir Martinez. Encouraged him to f/u with RIVAS Palmer CNP and he understood. NAD noted on discharge, gait steady. Reviewed discharge prescription for:     Current Discharge Medication List      START taking these medications    Details   ketorolac (TORADOL) 10 MG tablet Take 1 tablet by mouth every 6 hours as needed for Pain  Qty: 20 tablet, Refills: 0             Lauren Sand states understanding of how and when to take medications.       Electronically signed by Sabrina Way RN on 11/22/2021 at 7:02 PM     Sabrina Way RN  11/22/21 3947

## 2022-09-30 LAB
ALBUMIN SERPL-MCNC: 4.4 G/DL (ref 3.5–5.2)
ALBUMIN/GLOB SERPL: 1.8 G/DL
ALP SERPL-CCNC: 74 U/L (ref 39–117)
ALT SERPL W P-5'-P-CCNC: 17 U/L (ref 1–41)
ANION GAP SERPL CALCULATED.3IONS-SCNC: 7.6 MMOL/L (ref 5–15)
AST SERPL-CCNC: 28 U/L (ref 1–40)
BACTERIA UR QL AUTO: ABNORMAL /HPF
BASOPHILS # BLD AUTO: 0.08 10*3/MM3 (ref 0–0.2)
BASOPHILS NFR BLD AUTO: 1 % (ref 0–1.5)
BILIRUB SERPL-MCNC: 0.2 MG/DL (ref 0–1.2)
BILIRUB UR QL STRIP: NEGATIVE
BUN SERPL-MCNC: 16 MG/DL (ref 6–20)
BUN/CREAT SERPL: 11.8 (ref 7–25)
CALCIUM SPEC-SCNC: 9.6 MG/DL (ref 8.6–10.5)
CHLORIDE SERPL-SCNC: 102 MMOL/L (ref 98–107)
CLARITY UR: ABNORMAL
CO2 SERPL-SCNC: 29.4 MMOL/L (ref 22–29)
COLOR UR: YELLOW
CREAT SERPL-MCNC: 1.36 MG/DL (ref 0.76–1.27)
DEPRECATED RDW RBC AUTO: 42 FL (ref 37–54)
EGFRCR SERPLBLD CKD-EPI 2021: 70.5 ML/MIN/1.73
EOSINOPHIL # BLD AUTO: 0.13 10*3/MM3 (ref 0–0.4)
EOSINOPHIL NFR BLD AUTO: 1.7 % (ref 0.3–6.2)
ERYTHROCYTE [DISTWIDTH] IN BLOOD BY AUTOMATED COUNT: 12.1 % (ref 12.3–15.4)
GLOBULIN UR ELPH-MCNC: 2.4 GM/DL
GLUCOSE SERPL-MCNC: 112 MG/DL (ref 65–99)
GLUCOSE UR STRIP-MCNC: NEGATIVE MG/DL
HCT VFR BLD AUTO: 41.9 % (ref 37.5–51)
HGB BLD-MCNC: 14.3 G/DL (ref 13–17.7)
HGB UR QL STRIP.AUTO: NEGATIVE
HOLD SPECIMEN: NORMAL
HOLD SPECIMEN: NORMAL
HYALINE CASTS UR QL AUTO: ABNORMAL /LPF
IMM GRANULOCYTES # BLD AUTO: 0.01 10*3/MM3 (ref 0–0.05)
IMM GRANULOCYTES NFR BLD AUTO: 0.1 % (ref 0–0.5)
KETONES UR QL STRIP: NEGATIVE
LEUKOCYTE ESTERASE UR QL STRIP.AUTO: ABNORMAL
LIPASE SERPL-CCNC: 32 U/L (ref 13–60)
LYMPHOCYTES # BLD AUTO: 2.86 10*3/MM3 (ref 0.7–3.1)
LYMPHOCYTES NFR BLD AUTO: 37.2 % (ref 19.6–45.3)
MCH RBC QN AUTO: 31.9 PG (ref 26.6–33)
MCHC RBC AUTO-ENTMCNC: 34.1 G/DL (ref 31.5–35.7)
MCV RBC AUTO: 93.5 FL (ref 79–97)
MONOCYTES # BLD AUTO: 0.89 10*3/MM3 (ref 0.1–0.9)
MONOCYTES NFR BLD AUTO: 11.6 % (ref 5–12)
NEUTROPHILS NFR BLD AUTO: 3.71 10*3/MM3 (ref 1.7–7)
NEUTROPHILS NFR BLD AUTO: 48.4 % (ref 42.7–76)
NITRITE UR QL STRIP: NEGATIVE
NRBC BLD AUTO-RTO: 0 /100 WBC (ref 0–0.2)
PH UR STRIP.AUTO: 7.5 [PH] (ref 5–8)
PLATELET # BLD AUTO: 184 10*3/MM3 (ref 140–450)
PMV BLD AUTO: 9.4 FL (ref 6–12)
POTASSIUM SERPL-SCNC: 4.2 MMOL/L (ref 3.5–5.2)
PROT SERPL-MCNC: 6.8 G/DL (ref 6–8.5)
PROT UR QL STRIP: NEGATIVE
RBC # BLD AUTO: 4.48 10*6/MM3 (ref 4.14–5.8)
RBC # UR STRIP: ABNORMAL /HPF
REF LAB TEST METHOD: ABNORMAL
SODIUM SERPL-SCNC: 139 MMOL/L (ref 136–145)
SP GR UR STRIP: 1.02 (ref 1–1.03)
SQUAMOUS #/AREA URNS HPF: ABNORMAL /HPF
UROBILINOGEN UR QL STRIP: ABNORMAL
WBC # UR STRIP: ABNORMAL /HPF
WBC NRBC COR # BLD: 7.68 10*3/MM3 (ref 3.4–10.8)
WHOLE BLOOD HOLD COAG: NORMAL
WHOLE BLOOD HOLD SPECIMEN: NORMAL

## 2022-09-30 PROCEDURE — 85025 COMPLETE CBC W/AUTO DIFF WBC: CPT

## 2022-09-30 PROCEDURE — 36415 COLL VENOUS BLD VENIPUNCTURE: CPT

## 2022-09-30 PROCEDURE — 80053 COMPREHEN METABOLIC PANEL: CPT

## 2022-09-30 PROCEDURE — 99211 OFF/OP EST MAY X REQ PHY/QHP: CPT

## 2022-09-30 PROCEDURE — 99283 EMERGENCY DEPT VISIT LOW MDM: CPT

## 2022-09-30 PROCEDURE — 83690 ASSAY OF LIPASE: CPT

## 2022-09-30 PROCEDURE — 81001 URINALYSIS AUTO W/SCOPE: CPT

## 2022-09-30 RX ORDER — SODIUM CHLORIDE 0.9 % (FLUSH) 0.9 %
10 SYRINGE (ML) INJECTION AS NEEDED
Status: DISCONTINUED | OUTPATIENT
Start: 2022-09-30 | End: 2022-10-01 | Stop reason: HOSPADM

## 2022-10-01 ENCOUNTER — APPOINTMENT (OUTPATIENT)
Dept: CT IMAGING | Facility: HOSPITAL | Age: 32
End: 2022-10-01

## 2022-10-01 ENCOUNTER — HOSPITAL ENCOUNTER (EMERGENCY)
Facility: HOSPITAL | Age: 32
Discharge: LEFT BEFORE DISCHARGE | End: 2022-10-01
Attending: EMERGENCY MEDICINE

## 2022-10-01 VITALS
HEIGHT: 73 IN | TEMPERATURE: 98.2 F | SYSTOLIC BLOOD PRESSURE: 110 MMHG | BODY MASS INDEX: 23.23 KG/M2 | RESPIRATION RATE: 18 BRPM | OXYGEN SATURATION: 97 % | WEIGHT: 175.27 LBS | HEART RATE: 69 BPM | DIASTOLIC BLOOD PRESSURE: 70 MMHG

## 2022-10-01 PROCEDURE — 74176 CT ABD & PELVIS W/O CONTRAST: CPT

## 2022-10-01 RX ORDER — DICYCLOMINE HYDROCHLORIDE 10 MG/ML
20 INJECTION INTRAMUSCULAR ONCE
Status: DISCONTINUED | OUTPATIENT
Start: 2022-10-01 | End: 2022-10-01 | Stop reason: HOSPADM

## 2022-10-01 RX ORDER — ONDANSETRON 2 MG/ML
4 INJECTION INTRAMUSCULAR; INTRAVENOUS ONCE
Status: DISCONTINUED | OUTPATIENT
Start: 2022-10-01 | End: 2022-10-01 | Stop reason: HOSPADM

## 2022-10-01 NOTE — ED PROVIDER NOTES
Time: 4:31 AM EDT  Arrived by: private car  Chief Complaint: abdominal pain  History provided by: patient  History is limited by: not applicable    History of Present Illness:  Patient is a 32 y.o. year old male who presents to the emergency department with abdominal pain. Pt reports he has been experiencing       HPI    Similar Symptoms Previously: no  Recently seen: no      Patient Care Team  Primary Care Provider: System, Provider Not In    Past Medical History:     Allergies   Allergen Reactions   • Indometacin Sodium [Indomethacin] Hallucinations   • Penicillins Swelling     Rash swelling     • Amoxicillin Rash     Past Medical History:   Diagnosis Date   • Hypertension      Past Surgical History:   Procedure Laterality Date   • MOUTH SURGERY       History reviewed. No pertinent family history.    Home Medications:  Prior to Admission medications    Medication Sig Start Date End Date Taking? Authorizing Provider   HYDROcodone-acetaminophen (NORCO) 5-325 MG per tablet Take 1 tablet by mouth Every 12 (Twelve) Hours As Needed for Other (PRN PAIN). 6/7/19   Steven Ramirez MD   HYDROcodone-acetaminophen (NORCO) 5-325 MG per tablet Take 1 tablet by mouth Every 12 (Twelve) Hours As Needed for Other (PRN PAIN). 6/11/19   Rosendo Aquino MD   meloxicam (MOBIC) 7.5 MG tablet Take 1 tablet by mouth Daily. 6/11/19   Medardo Willard PA-C   NON FORMULARY saboxin    Umesh Lyles MD   ondansetron ODT (ZOFRAN-ODT) 4 MG disintegrating tablet dissolve 1 tablet ON TONGUE every 4 hours if needed for nausea and vomiting 6/6/19   Umesh Lyles MD   oxyCODONE-acetaminophen (PERCOCET)  MG per tablet take 1 tablet by mouth every 4 hours if needed for pain 6/6/19   Umesh Lyles MD        Social History:   Social History     Tobacco Use   • Smoking status: Current Every Day Smoker   • Smokeless tobacco: Never Used   Substance Use Topics   • Alcohol use: Not Currently   • Drug use: Yes      "Types: Marijuana     Recent travel: not applicable     Review of Systems:  Review of Systems   Constitutional: Negative for chills and fever.   HENT: Negative for congestion, rhinorrhea and sore throat.    Eyes: Negative for pain and visual disturbance.   Respiratory: Negative for apnea, cough, chest tightness and shortness of breath.    Cardiovascular: Negative for chest pain and palpitations.   Gastrointestinal: Negative for abdominal pain, diarrhea, nausea and vomiting.   Genitourinary: Negative for difficulty urinating and dysuria.   Musculoskeletal: Negative for joint swelling and myalgias.   Skin: Negative for color change.   Neurological: Negative for seizures and headaches.   Psychiatric/Behavioral: Negative.    All other systems reviewed and are negative.       Physical Exam:  /70 (BP Location: Right arm, Patient Position: Lying)   Pulse 69   Temp 98.2 °F (36.8 °C) (Oral)   Resp 18   Ht 185.4 cm (73\")   Wt 79.5 kg (175 lb 4.3 oz)   SpO2 97%   BMI 23.12 kg/m²     Physical Exam  Vitals and nursing note reviewed.   Constitutional:       General: He is not in acute distress.  HENT:      Head: Normocephalic and atraumatic.   Eyes:      Extraocular Movements: Extraocular movements intact.   Cardiovascular:      Rate and Rhythm: Normal rate and regular rhythm.   Pulmonary:      Effort: Pulmonary effort is normal. No respiratory distress.      Breath sounds: Normal breath sounds.   Abdominal:      General: Abdomen is flat.      Palpations: Abdomen is soft.      Tenderness: There is no abdominal tenderness.   Musculoskeletal:         General: Normal range of motion.      Cervical back: Normal range of motion and neck supple.   Skin:     General: Skin is warm and dry.      Capillary Refill: Capillary refill takes less than 2 seconds.   Neurological:      Mental Status: He is alert and oriented to person, place, and time. Mental status is at baseline.                Medications in the Emergency " Department:  Medications   sodium chloride 0.9 % flush 10 mL (has no administration in time range)   sodium chloride 0.9 % bolus 500 mL (500 mL Intravenous Not Given 10/1/22 0245)   dicyclomine (BENTYL) injection 20 mg (20 mg Intramuscular Not Given 10/1/22 0244)   ondansetron (ZOFRAN) injection 4 mg (4 mg Intravenous Not Given 10/1/22 0245)        Labs  Lab Results (last 24 hours)     Procedure Component Value Units Date/Time    CBC & Differential [[id number]]  (Abnormal) Collected: 09/30/22 2245    Specimen: Blood Updated: 09/30/22 2327    Narrative:      The following orders were created for panel order CBC & Differential.  Procedure                               Abnormality         Status                     ---------                               -----------         ------                     CBC Auto Differential[[id number]]        Abnormal            Final result                 Please view results for these tests on the individual orders.    Comprehensive Metabolic Panel [[id number]]  (Abnormal) Collected: 09/30/22 2245    Specimen: Blood Updated: 09/30/22 2351     Glucose 112 mg/dL      BUN 16 mg/dL      Creatinine 1.36 mg/dL      Sodium 139 mmol/L      Potassium 4.2 mmol/L      Chloride 102 mmol/L      CO2 29.4 mmol/L      Calcium 9.6 mg/dL      Total Protein 6.8 g/dL      Albumin 4.40 g/dL      ALT (SGPT) 17 U/L      AST (SGOT) 28 U/L      Alkaline Phosphatase 74 U/L      Total Bilirubin 0.2 mg/dL      Globulin 2.4 gm/dL      A/G Ratio 1.8 g/dL      BUN/Creatinine Ratio 11.8     Anion Gap 7.6 mmol/L      eGFR 70.5 mL/min/1.73      Comment: National Kidney Foundation and American Society of Nephrology (ASN) Task Force recommended calculation based on the Chronic Kidney Disease Epidemiology Collaboration (CKD-EPI) equation refit without adjustment for race.       Narrative:      GFR Normal >60  Chronic Kidney Disease <60  Kidney Failure <15      Lipase [[id number]]  (Normal) Collected: 09/30/22 2245     Specimen: Blood Updated: 09/30/22 2351     Lipase 32 U/L     CBC Auto Differential [[id number]]  (Abnormal) Collected: 09/30/22 2245    Specimen: Blood Updated: 09/30/22 2327     WBC 7.68 10*3/mm3      RBC 4.48 10*6/mm3      Hemoglobin 14.3 g/dL      Hematocrit 41.9 %      MCV 93.5 fL      MCH 31.9 pg      MCHC 34.1 g/dL      RDW 12.1 %      RDW-SD 42.0 fl      MPV 9.4 fL      Platelets 184 10*3/mm3      Neutrophil % 48.4 %      Lymphocyte % 37.2 %      Monocyte % 11.6 %      Eosinophil % 1.7 %      Basophil % 1.0 %      Immature Grans % 0.1 %      Neutrophils, Absolute 3.71 10*3/mm3      Lymphocytes, Absolute 2.86 10*3/mm3      Monocytes, Absolute 0.89 10*3/mm3      Eosinophils, Absolute 0.13 10*3/mm3      Basophils, Absolute 0.08 10*3/mm3      Immature Grans, Absolute 0.01 10*3/mm3      nRBC 0.0 /100 WBC     Urinalysis With Microscopic If Indicated (No Culture) - [[id number]]  (Abnormal) Collected: 09/30/22 2251    Specimen: Urine Updated: 09/30/22 2336     Color, UA Yellow     Appearance, UA Turbid     pH, UA 7.5     Specific Gravity, UA 1.020     Glucose, UA Negative     Ketones, UA Negative     Bilirubin, UA Negative     Blood, UA Negative     Protein, UA Negative     Leuk Esterase, UA Trace     Nitrite, UA Negative     Urobilinogen, UA 1.0 E.U./dL    Urinalysis, Microscopic Only - Urine, Clean Catch [[id number]]  (Abnormal) Collected: 09/30/22 2251    Specimen: Urine Updated: 09/30/22 2336     RBC, UA 0-2 /HPF      WBC, UA 6-12 /HPF      Bacteria, UA None Seen /HPF      Squamous Epithelial Cells, UA 0-2 /HPF      Hyaline Casts, UA None Seen /LPF      Methodology Automated Microscopy           Imaging:  No Radiology Exams Resulted Within Past 24 Hours    Procedures:  Procedures    Progress  ED Course as of 10/01/22 0338   Sat Oct 01, 2022   0248 A CT scan some IV fluids and IV medications were placed for the patient while waiting to be seen.  The patient is refusing to have any type of IV access, IV fluids, or IV  for his CT scan. [TC]      ED Course User Index  [TC] Lucita Chu APRN                            Medical Decision Making:  MDM     Final diagnoses:   None        Disposition:  ED Disposition     None          This medical record created using voice recognition software.      Documentation assistance provided by Deepak pAonte acting as scribe for Vignesh Winters DO. Information recorded by the scribe was done at my direction and has been verified and validated by me.       Deepak Aponte  10/01/22 0442

## 2022-10-04 ENCOUNTER — APPOINTMENT (OUTPATIENT)
Dept: GENERAL RADIOLOGY | Facility: HOSPITAL | Age: 32
End: 2022-10-04

## 2022-10-04 ENCOUNTER — HOSPITAL ENCOUNTER (EMERGENCY)
Facility: HOSPITAL | Age: 32
Discharge: HOME | End: 2022-10-04
Attending: STUDENT IN AN ORGANIZED HEALTH CARE EDUCATION/TRAINING PROGRAM | Admitting: STUDENT IN AN ORGANIZED HEALTH CARE EDUCATION/TRAINING PROGRAM

## 2022-10-04 VITALS
HEART RATE: 68 BPM | SYSTOLIC BLOOD PRESSURE: 130 MMHG | RESPIRATION RATE: 20 BRPM | HEIGHT: 73 IN | OXYGEN SATURATION: 100 % | WEIGHT: 175.04 LBS | TEMPERATURE: 98 F | DIASTOLIC BLOOD PRESSURE: 77 MMHG | BODY MASS INDEX: 23.2 KG/M2

## 2022-10-04 DIAGNOSIS — M54.50 ACUTE LEFT-SIDED LOW BACK PAIN WITHOUT SCIATICA: Primary | ICD-10-CM

## 2022-10-04 DIAGNOSIS — E16.2 HYPOGLYCEMIA: ICD-10-CM

## 2022-10-04 LAB
ALBUMIN SERPL-MCNC: 4.1 G/DL (ref 3.5–5.2)
ALBUMIN/GLOB SERPL: 1.8 G/DL
ALP SERPL-CCNC: 78 U/L (ref 39–117)
ALT SERPL W P-5'-P-CCNC: 15 U/L (ref 1–41)
AMORPH URATE CRY URNS QL MICRO: ABNORMAL /HPF
ANION GAP SERPL CALCULATED.3IONS-SCNC: 6.6 MMOL/L (ref 5–15)
AST SERPL-CCNC: 22 U/L (ref 1–40)
BACTERIA UR QL AUTO: ABNORMAL /HPF
BASOPHILS # BLD AUTO: 0.04 10*3/MM3 (ref 0–0.2)
BASOPHILS NFR BLD AUTO: 0.6 % (ref 0–1.5)
BILIRUB SERPL-MCNC: 0.2 MG/DL (ref 0–1.2)
BILIRUB UR QL STRIP: NEGATIVE
BUN SERPL-MCNC: 12 MG/DL (ref 6–20)
BUN/CREAT SERPL: 13.3 (ref 7–25)
CALCIUM SPEC-SCNC: 9.1 MG/DL (ref 8.6–10.5)
CHLORIDE SERPL-SCNC: 102 MMOL/L (ref 98–107)
CLARITY UR: ABNORMAL
CO2 SERPL-SCNC: 31.4 MMOL/L (ref 22–29)
COLOR UR: YELLOW
CREAT SERPL-MCNC: 0.9 MG/DL (ref 0.76–1.27)
DEPRECATED RDW RBC AUTO: 41.6 FL (ref 37–54)
EGFRCR SERPLBLD CKD-EPI 2021: 115.7 ML/MIN/1.73
EOSINOPHIL # BLD AUTO: 0.08 10*3/MM3 (ref 0–0.4)
EOSINOPHIL NFR BLD AUTO: 1.1 % (ref 0.3–6.2)
ERYTHROCYTE [DISTWIDTH] IN BLOOD BY AUTOMATED COUNT: 11.9 % (ref 12.3–15.4)
GLOBULIN UR ELPH-MCNC: 2.3 GM/DL
GLUCOSE BLDC GLUCOMTR-MCNC: 118 MG/DL (ref 70–99)
GLUCOSE BLDC GLUCOMTR-MCNC: 97 MG/DL (ref 70–99)
GLUCOSE SERPL-MCNC: 107 MG/DL (ref 65–99)
GLUCOSE UR STRIP-MCNC: NEGATIVE MG/DL
HCT VFR BLD AUTO: 41.5 % (ref 37.5–51)
HGB BLD-MCNC: 14.4 G/DL (ref 13–17.7)
HGB UR QL STRIP.AUTO: NEGATIVE
HOLD SPECIMEN: NORMAL
HOLD SPECIMEN: NORMAL
HYALINE CASTS UR QL AUTO: ABNORMAL /LPF
IMM GRANULOCYTES # BLD AUTO: 0.01 10*3/MM3 (ref 0–0.05)
IMM GRANULOCYTES NFR BLD AUTO: 0.1 % (ref 0–0.5)
KETONES UR QL STRIP: NEGATIVE
LEUKOCYTE ESTERASE UR QL STRIP.AUTO: ABNORMAL
LYMPHOCYTES # BLD AUTO: 2.3 10*3/MM3 (ref 0.7–3.1)
LYMPHOCYTES NFR BLD AUTO: 32.5 % (ref 19.6–45.3)
MAGNESIUM SERPL-MCNC: 1.9 MG/DL (ref 1.6–2.6)
MCH RBC QN AUTO: 32.5 PG (ref 26.6–33)
MCHC RBC AUTO-ENTMCNC: 34.7 G/DL (ref 31.5–35.7)
MCV RBC AUTO: 93.7 FL (ref 79–97)
MONOCYTES # BLD AUTO: 0.63 10*3/MM3 (ref 0.1–0.9)
MONOCYTES NFR BLD AUTO: 8.9 % (ref 5–12)
NEUTROPHILS NFR BLD AUTO: 4.02 10*3/MM3 (ref 1.7–7)
NEUTROPHILS NFR BLD AUTO: 56.8 % (ref 42.7–76)
NITRITE UR QL STRIP: NEGATIVE
NRBC BLD AUTO-RTO: 0 /100 WBC (ref 0–0.2)
PH UR STRIP.AUTO: 8 [PH] (ref 5–8)
PLATELET # BLD AUTO: 178 10*3/MM3 (ref 140–450)
PMV BLD AUTO: 9.5 FL (ref 6–12)
POTASSIUM SERPL-SCNC: 4.3 MMOL/L (ref 3.5–5.2)
PROT SERPL-MCNC: 6.4 G/DL (ref 6–8.5)
PROT UR QL STRIP: NEGATIVE
QT INTERVAL: 380 MS
RBC # BLD AUTO: 4.43 10*6/MM3 (ref 4.14–5.8)
RBC # UR STRIP: ABNORMAL /HPF
REF LAB TEST METHOD: ABNORMAL
SODIUM SERPL-SCNC: 140 MMOL/L (ref 136–145)
SP GR UR STRIP: 1.02 (ref 1–1.03)
SQUAMOUS #/AREA URNS HPF: ABNORMAL /HPF
TROPONIN T SERPL-MCNC: <0.01 NG/ML (ref 0–0.03)
UROBILINOGEN UR QL STRIP: ABNORMAL
WBC # UR STRIP: ABNORMAL /HPF
WBC NRBC COR # BLD: 7.08 10*3/MM3 (ref 3.4–10.8)
WHOLE BLOOD HOLD COAG: NORMAL
WHOLE BLOOD HOLD SPECIMEN: NORMAL

## 2022-10-04 PROCEDURE — 82962 GLUCOSE BLOOD TEST: CPT

## 2022-10-04 PROCEDURE — 83735 ASSAY OF MAGNESIUM: CPT

## 2022-10-04 PROCEDURE — 84484 ASSAY OF TROPONIN QUANT: CPT

## 2022-10-04 PROCEDURE — 81001 URINALYSIS AUTO W/SCOPE: CPT

## 2022-10-04 PROCEDURE — 36415 COLL VENOUS BLD VENIPUNCTURE: CPT

## 2022-10-04 PROCEDURE — 85025 COMPLETE CBC W/AUTO DIFF WBC: CPT

## 2022-10-04 PROCEDURE — 80053 COMPREHEN METABOLIC PANEL: CPT

## 2022-10-04 PROCEDURE — 93010 ELECTROCARDIOGRAM REPORT: CPT | Performed by: INTERNAL MEDICINE

## 2022-10-04 PROCEDURE — 71045 X-RAY EXAM CHEST 1 VIEW: CPT

## 2022-10-04 PROCEDURE — 93005 ELECTROCARDIOGRAM TRACING: CPT

## 2022-10-04 PROCEDURE — 93005 ELECTROCARDIOGRAM TRACING: CPT | Performed by: STUDENT IN AN ORGANIZED HEALTH CARE EDUCATION/TRAINING PROGRAM

## 2022-10-04 PROCEDURE — 99283 EMERGENCY DEPT VISIT LOW MDM: CPT

## 2022-10-04 RX ORDER — SODIUM CHLORIDE 0.9 % (FLUSH) 0.9 %
10 SYRINGE (ML) INJECTION AS NEEDED
Status: DISCONTINUED | OUTPATIENT
Start: 2022-10-04 | End: 2022-10-04 | Stop reason: HOSPADM

## 2022-10-04 NOTE — ED PROVIDER NOTES
Time: 22:36 EDT  Arrived by: Vehicle  Chief Complaint: Low blood sugar and back pain  History provided by: Patient  History is limited by: N/A    History of Present Illness:  Patient is a 32 y.o. year old male that presents to the emergency department with complaints of episodes of low blood sugar over the last couple of days.  Denies any history of diabetes that he is aware of.  He reports that he has felt shaky and has had his blood sugar tested at the rehab facility that he is currently staying at.  He states that he got as low as 47.  He states that he has been eating regularly but it still does not seem to help.  He states that he has been eating extra sugar as well as glucose tablets to try to help with his low glucose.    Additionally, patient reports he has had some intermittent left-sided back and flank pain.  Been ongoing and intermittent for several days.  Reports worsens with movement.  Denies any known injury.  He denies any dysuria or hematuria.  Denies fever.  Denies any treatment prior to arrival.    Patient reports he is currently at rehab and is trying to get his life together and get his health straightened out.      History provided by:  Patient   used: No    Back Pain  Location:  Lumbar spine  Quality:  Aching  Radiates to:  Does not radiate  Pain severity:  Mild  Onset quality:  Gradual  Timing:  Intermittent  Progression:  Improving  Associated symptoms: abdominal pain (Left flank pain)    Associated symptoms: no chest pain, no dysuria, no fever, no headaches and no weakness    Hypoglycemia  Associated symptoms: no anxiety, no dizziness, no shortness of breath, no vomiting and no weakness            Similar Symptoms Previously: No  Recently seen: No      Patient Care Team  Primary Care Provider: Unknown    Past Medical History:     Allergies   Allergen Reactions   • Indometacin Sodium [Indomethacin] Hallucinations   • Penicillins Swelling     Rash swelling     • Amoxicillin  Rash     Past Medical History:   Diagnosis Date   • Hypertension      Past Surgical History:   Procedure Laterality Date   • MOUTH SURGERY       No family history on file.    Home Medications:  Prior to Admission medications    Medication Sig Start Date End Date Taking? Authorizing Provider   HYDROcodone-acetaminophen (NORCO) 5-325 MG per tablet Take 1 tablet by mouth Every 12 (Twelve) Hours As Needed for Other (PRN PAIN). 6/7/19   tSeven Ramirez MD   HYDROcodone-acetaminophen (NORCO) 5-325 MG per tablet Take 1 tablet by mouth Every 12 (Twelve) Hours As Needed for Other (PRN PAIN). 6/11/19   Rosendo Aquino MD   meloxicam (MOBIC) 7.5 MG tablet Take 1 tablet by mouth Daily. 6/11/19   Medardo Willard PA-C   NON FORMULARY saboxin    ProviderUmesh MD   ondansetron ODT (ZOFRAN-ODT) 4 MG disintegrating tablet dissolve 1 tablet ON TONGUE every 4 hours if needed for nausea and vomiting 6/6/19   Umesh Lyles MD   oxyCODONE-acetaminophen (PERCOCET)  MG per tablet take 1 tablet by mouth every 4 hours if needed for pain 6/6/19   ProviderUmesh MD        Social History:   PT  reports that he has been smoking. He has never used smokeless tobacco. He reports previous alcohol use. He reports current drug use. Drug: Marijuana.    Record Review:  I have reviewed the patient's records in [facility].     Review of Systems  Review of Systems   Constitutional: Negative for chills, fatigue and fever.   HENT: Negative for rhinorrhea and sore throat.    Eyes: Negative.    Respiratory: Negative for cough, chest tightness and shortness of breath.    Cardiovascular: Negative for chest pain and palpitations.   Gastrointestinal: Positive for abdominal pain (Left flank pain). Negative for diarrhea, nausea and vomiting.   Endocrine: Negative.    Genitourinary: Negative for decreased urine volume, difficulty urinating, dysuria, flank pain, frequency, hematuria, testicular pain and urgency.   Musculoskeletal:  "Positive for back pain (Left flank pain). Negative for arthralgias and myalgias.   Skin: Negative for color change, rash and wound.   Allergic/Immunologic: Negative.    Neurological: Negative for dizziness, syncope, weakness, light-headedness and headaches.   Hematological: Negative.    Psychiatric/Behavioral: Negative for agitation and confusion. The patient is not nervous/anxious.         Physical Exam  /77 (Patient Position: Sitting)   Pulse 68   Temp 98 °F (36.7 °C) (Oral)   Resp 20   Ht 185.4 cm (73\")   Wt 79.4 kg (175 lb 0.7 oz)   SpO2 100%   BMI 23.09 kg/m²     Physical Exam  Vitals and nursing note reviewed.   Constitutional:       General: He is not in acute distress.     Appearance: Normal appearance. He is normal weight. He is not ill-appearing, toxic-appearing or diaphoretic.   HENT:      Head: Normocephalic and atraumatic.      Nose: Nose normal.      Mouth/Throat:      Mouth: Mucous membranes are moist.      Pharynx: Oropharynx is clear.   Eyes:      Extraocular Movements: Extraocular movements intact.      Conjunctiva/sclera: Conjunctivae normal.      Pupils: Pupils are equal, round, and reactive to light.   Cardiovascular:      Rate and Rhythm: Normal rate and regular rhythm.      Pulses: Normal pulses.      Heart sounds: Normal heart sounds. No murmur heard.    No gallop.   Pulmonary:      Effort: Pulmonary effort is normal. No respiratory distress.      Breath sounds: Normal breath sounds. No wheezing, rhonchi or rales.   Chest:      Chest wall: No tenderness.   Abdominal:      General: Bowel sounds are normal. There is no distension.      Palpations: Abdomen is soft. There is no mass.      Tenderness: There is no abdominal tenderness. There is left CVA tenderness. There is no right CVA tenderness.   Musculoskeletal:         General: Tenderness (Left flank pain) present. No swelling or deformity. Normal range of motion.      Cervical back: Normal range of motion and neck supple. No " "tenderness.   Skin:     General: Skin is warm and dry.      Capillary Refill: Capillary refill takes less than 2 seconds.      Coloration: Skin is not pale.      Findings: No erythema or rash.   Neurological:      General: No focal deficit present.      Mental Status: He is alert and oriented to person, place, and time.      Motor: No weakness.   Psychiatric:         Mood and Affect: Mood normal.         Behavior: Behavior normal.                  ED Course  /77 (Patient Position: Sitting)   Pulse 68   Temp 98 °F (36.7 °C) (Oral)   Resp 20   Ht 185.4 cm (73\")   Wt 79.4 kg (175 lb 0.7 oz)   SpO2 100%   BMI 23.09 kg/m²   Results for orders placed or performed during the hospital encounter of 10/04/22   Comprehensive Metabolic Panel    Specimen: Arm, Right; Blood   Result Value Ref Range    Glucose 107 (H) 65 - 99 mg/dL    BUN 12 6 - 20 mg/dL    Creatinine 0.90 0.76 - 1.27 mg/dL    Sodium 140 136 - 145 mmol/L    Potassium 4.3 3.5 - 5.2 mmol/L    Chloride 102 98 - 107 mmol/L    CO2 31.4 (H) 22.0 - 29.0 mmol/L    Calcium 9.1 8.6 - 10.5 mg/dL    Total Protein 6.4 6.0 - 8.5 g/dL    Albumin 4.10 3.50 - 5.20 g/dL    ALT (SGPT) 15 1 - 41 U/L    AST (SGOT) 22 1 - 40 U/L    Alkaline Phosphatase 78 39 - 117 U/L    Total Bilirubin 0.2 0.0 - 1.2 mg/dL    Globulin 2.3 gm/dL    A/G Ratio 1.8 g/dL    BUN/Creatinine Ratio 13.3 7.0 - 25.0    Anion Gap 6.6 5.0 - 15.0 mmol/L    eGFR 115.7 >60.0 mL/min/1.73   Troponin    Specimen: Arm, Right; Blood   Result Value Ref Range    Troponin T <0.010 0.000 - 0.030 ng/mL   Magnesium    Specimen: Arm, Right; Blood   Result Value Ref Range    Magnesium 1.9 1.6 - 2.6 mg/dL   Urinalysis With Microscopic If Indicated (No Culture) - Urine, Clean Catch    Specimen: Urine, Clean Catch   Result Value Ref Range    Color, UA Yellow Yellow, Straw    Appearance, UA Turbid (A) Clear    pH, UA 8.0 5.0 - 8.0    Specific Gravity, UA 1.019 1.005 - 1.030    Glucose, UA Negative Negative    Ketones, UA " Negative Negative    Bilirubin, UA Negative Negative    Blood, UA Negative Negative    Protein, UA Negative Negative    Leuk Esterase, UA Trace (A) Negative    Nitrite, UA Negative Negative    Urobilinogen, UA 1.0 E.U./dL 0.2 - 1.0 E.U./dL   CBC Auto Differential    Specimen: Arm, Right; Blood   Result Value Ref Range    WBC 7.08 3.40 - 10.80 10*3/mm3    RBC 4.43 4.14 - 5.80 10*6/mm3    Hemoglobin 14.4 13.0 - 17.7 g/dL    Hematocrit 41.5 37.5 - 51.0 %    MCV 93.7 79.0 - 97.0 fL    MCH 32.5 26.6 - 33.0 pg    MCHC 34.7 31.5 - 35.7 g/dL    RDW 11.9 (L) 12.3 - 15.4 %    RDW-SD 41.6 37.0 - 54.0 fl    MPV 9.5 6.0 - 12.0 fL    Platelets 178 140 - 450 10*3/mm3    Neutrophil % 56.8 42.7 - 76.0 %    Lymphocyte % 32.5 19.6 - 45.3 %    Monocyte % 8.9 5.0 - 12.0 %    Eosinophil % 1.1 0.3 - 6.2 %    Basophil % 0.6 0.0 - 1.5 %    Immature Grans % 0.1 0.0 - 0.5 %    Neutrophils, Absolute 4.02 1.70 - 7.00 10*3/mm3    Lymphocytes, Absolute 2.30 0.70 - 3.10 10*3/mm3    Monocytes, Absolute 0.63 0.10 - 0.90 10*3/mm3    Eosinophils, Absolute 0.08 0.00 - 0.40 10*3/mm3    Basophils, Absolute 0.04 0.00 - 0.20 10*3/mm3    Immature Grans, Absolute 0.01 0.00 - 0.05 10*3/mm3    nRBC 0.0 0.0 - 0.2 /100 WBC   Urinalysis, Microscopic Only - Urine, Clean Catch    Specimen: Urine, Clean Catch   Result Value Ref Range    RBC, UA None Seen None Seen /HPF    WBC, UA 0-2 (A) None Seen /HPF    Bacteria, UA None Seen None Seen /HPF    Squamous Epithelial Cells, UA 0-2 None Seen, 0-2 /HPF    Hyaline Casts, UA None Seen None Seen /LPF    Amorphous Crystals, UA Small/1+ None Seen /HPF    Methodology Automated Microscopy    POC Glucose Once    Specimen: Blood   Result Value Ref Range    Glucose 118 (H) 70 - 99 mg/dL   POC Glucose Once    Specimen: Blood   Result Value Ref Range    Glucose 97 70 - 99 mg/dL   ECG 12 Lead   Result Value Ref Range    QT Interval 380 ms   Green Top (Gel)   Result Value Ref Range    Extra Tube Hold for add-ons.    Lavender Top    Result Value Ref Range    Extra Tube hold for add-on    Gold Top - SST   Result Value Ref Range    Extra Tube Hold for add-ons.    Light Blue Top   Result Value Ref Range    Extra Tube Hold for add-ons.      Medications - No data to display  CT Abdomen Pelvis Without Contrast    Result Date: 10/1/2022  Narrative: PROCEDURE: CT ABDOMEN PELVIS WO CONTRAST  COMPARISON: None.  INDICATIONS: low back pain, beginning today  TECHNIQUE: 635 CT images were created without intravenous or oral contrast agent administration.   PROTOCOL:   Standard CT imaging protocol performed    RADIATION:   Total DLP: 408 mGy*cm   Automated exposure control was utilized to minimize radiation dose.  FINDINGS:  LIVER: Normal.  No enlargement, atrophy, abnormal density, or significant focal lesion.  BILIARY: Normal.  No visible dilatation or calcification.  PANCREAS: Normal.  No lesion, fluid collection, ductal dilatation, or atrophy.  No acute pancreatitis. SPLEEN: Normal.  No enlargement or focal lesion.  KIDNEYS: Normal.  No mass, obstruction, or calcification.  No hydronephrosis. ADRENALS: Normal.  No mass or enlargement.  AORTA/VASCULAR: Normal.  No aneurysm.  RETROPERITONEUM: Normal.  No mass or adenopathy.  BOWEL/MESENTERY: No visible mass, obstruction, or bowel wall thickening.  No acute appendicitis.  No pneumoperitoneum or pneumatosis.  No acute colitis or diverticulitis.  There is formed stool throughout the colon, suggesting fecal stasis as with constipation.  No fecal impaction is suggested. ABDOMINAL WALL: Normal.  No mass or hernia.  URINARY BLADDER: Normal.  No visible focal wall thickening, lesion, or calculus.  PELVIC NODES: Normal.  No adenopathy.  PELVIC ORGANS: No visible mass.  Pelvic organs appropriate for patient age.  There are prostatic calcifications.  Probably no prostatomegaly is seen. BONES: There is diffuse striated appearance of the osseous structures, especially involving the spine, with prominent vertical  "somewhat sclerotic trabeculae, as with a \"corduroy\" sign, which is nonspecific.  Osteopenia would be in the differential diagnosis.  Multiple-level intra-osseous hemangiomas (or venous malformations/hamartomas) are possible, as well.  No definite nonenhanced CT findings are appreciated to suggest soft tissue hemangiomas. LUNG BASES: There may be bibasilar subsegmental atelectasis.  Acute infiltrate is thought to be less likely.  OTHER: Negative.       Impression:  No acute abnormalities are seen by nonenhanced CT examination of the abdomen and pelvis.  Please see above comments for further detail.     Please note that portions of this note were completed with a voice recognition program.  FELICIANO FRANKLIN JR, MD       Electronically Signed and Approved By: FELICIANO FRANKLIN JR, MD on 10/01/2022 at 3:53              XR Chest 1 View    Result Date: 10/4/2022  Narrative: PROCEDURE: XR CHEST 1 VW  COMPARISON: None  INDICATIONS: Weak/Dizzy/AMS triage protocol  FINDINGS:   The lungs are well-expanded. The heart and pulmonary vasculature are within normal limits. No pleural effusions are identified. There are no active appearing infiltrates.  IMPRESSION: No active disease.  IESHA SALAZAR MD       Electronically Signed and Approved By: IESHA SALAZAR MD on 10/04/2022 at 15:12               Procedures/EKGs:  Procedures    HEART RATE= 73  bpm  RR Interval= 820  ms  IA Interval= 119  ms  P Horizontal Axis= 37  deg  P Front Axis= 72  deg  QRSD Interval= 100  ms  QT Interval= 380  ms  QRS Axis= 81  deg  T Wave Axis= 69  deg  - OTHERWISE NORMAL ECG -  Sinus rhythm  Borderline short IA interval  ST elev, probable normal early repol pattern  No previous ECG available for comparison  Electronically Signed By: Mila Singh ([facility]) 04-Oct-2022 18:51:37  Date and Time of Study: 2022-10-04 15:17:32      Medical Decision Making:                     MDM  Number of Diagnoses or Management Options  Acute left-sided low back pain without " sciatica  Hypoglycemia  Diagnosis management comments: CBC is normal.  CMP is normal.  With normal glucose.  POC repeat glucose was normal as well.  Lipase was normal.  Urinalysis was normal did not indicate any urinary tract infection.  No hematuria.    Chest x-ray performed showed no evidence of any acute cardiopulmonary abnormality.  Oxygen saturations 100% on room air.  Patient has no respiratory distress.    Patient was offered medication for pain and he declined.  He states that his back is feeling somewhat better upon examination and he states that he is just ready to go and he will follow-up by finding a family doctor on his own.  Additionally, I offered to feed the patient before he left and he declined.  He states that he would get something to eat when he got back to the rehab facility.    I encouraged the patient to return to the emergency department with any new or worsening symptoms.  Additionally, I instructed the patient that it would be very important for him to follow-up with the family doctor for any further health maintenance and chronic conditions.  He verbalized understanding.  Vitals are stable.  Patient is afebrile.  He is neurologically intact.       Amount and/or Complexity of Data Reviewed  Clinical lab tests: ordered and reviewed  Tests in the radiology section of CPT®: ordered and reviewed    Risk of Complications, Morbidity, and/or Mortality  Presenting problems: moderate  Diagnostic procedures: moderate  Management options: low    Patient Progress  Patient progress: stable       Final diagnoses:   Acute left-sided low back pain without sciatica   Hypoglycemia        Disposition:  ED Disposition     ED Disposition   Discharge    Condition   Stable    Comment   --              Yuliana Oneil, LISA  10/05/22 2236       Yuliana Oneil, APRJESSIE  10/05/22 223

## 2022-10-04 NOTE — DISCHARGE INSTRUCTIONS
Continue checking your blood sugar at least twice daily and keep a record.  Make sure you are eating at least 3 meals a day along with 2-3 snacks daily.  Avoid sugary foods.  Recommend eating more protein and complex carbohydrates.  Call and schedule an appointment with a family doctor for follow-up for testing for diabetes and monitoring of your blood sugars.  As far as your back pain goes, you may take Tylenol or ibuprofen if you continue to have issues.  Return to the emergency department with any new or worsening symptoms such as fever, urinating blood, worsening pain, nausea or vomiting that will not go away.